# Patient Record
Sex: FEMALE
[De-identification: names, ages, dates, MRNs, and addresses within clinical notes are randomized per-mention and may not be internally consistent; named-entity substitution may affect disease eponyms.]

---

## 2017-10-09 ENCOUNTER — HOSPITAL ENCOUNTER (OUTPATIENT)
Dept: HOSPITAL 39 - LAB.O | Age: 32
Discharge: HOME | End: 2017-10-09
Attending: FAMILY MEDICINE
Payer: COMMERCIAL

## 2017-10-09 DIAGNOSIS — R53.83: Primary | ICD-10-CM

## 2017-10-09 DIAGNOSIS — M79.1: ICD-10-CM

## 2019-10-21 ENCOUNTER — HOSPITAL ENCOUNTER (OUTPATIENT)
Dept: HOSPITAL 39 - US | Age: 34
End: 2019-10-21
Attending: FAMILY MEDICINE
Payer: COMMERCIAL

## 2019-10-21 DIAGNOSIS — O46.92: Primary | ICD-10-CM

## 2019-10-21 DIAGNOSIS — Z3A.13: ICD-10-CM

## 2019-10-21 NOTE — US
EXAM DESCRIPTION: 

OB Pregnancy,Early (0-14wks): Ultrasound.



CLINICAL HISTORY: 

33 years Female possible placenta previa obstetrical history

unknown. Clinical EGA 12 weeks and 4 days. ANGEL 2020. 



COMPARISON: 

None. 



TECHNIQUE: 

Transpelvic scanning through the urine filled bladder:

Endovaginal scannin-dimensional and Doppler modes.



FINDINGS: 

Uterus: 9.7 x 6.8 centimeters. Placenta anterior.

Gestational sac: Well-defined

AFV: Subjectively normal.

Fetal pole: Mean crown-rump length is 6.9 cm with EGA 13 weeks

and 1 day.

Yolk sac: Not seen.

Fetal heart tones: 157 bpm.

Subchorionic hemorrhage: 3.5 cm abutting the placenta.

Cul-de-sac: No fluid.

Comments: EGA corresponds to ANGEL 2020.

Right ovary not seen in the right adnexa . No adnexal mass or

free fluid.  Left ovary 2.3 x 2.2 x 1.7 cm. Normal waveform and

color Doppler vascularity. No dominant cyst. No adnexal mass or

free fluid.



IMPRESSION: 

1. Single live intrauterine gestation. EGA 13 weeks and 1 day

corresponds to ANGEL 2020. This is 4 days older than EGA by

medical history.

2. Small subchorionic hemorrhages abutting the placenta. Placenta

anterior but too early to evaluate for previa. Recommend

follow-up for placental position and subchorionic hemorrhage 4

week interval.

3. Left ovary unremarkable. Right ovary not seen in the adnexa

due to intestinal gas shadowing. No fluid in either adnexa or

cul-de-sac.



Electronically signed by:  Enrrique Rodriguez MD  10/21/2019 5:33 PM

CDT Workstation: [id number]